# Patient Record
Sex: FEMALE | Race: WHITE | NOT HISPANIC OR LATINO | Employment: FULL TIME | URBAN - METROPOLITAN AREA
[De-identification: names, ages, dates, MRNs, and addresses within clinical notes are randomized per-mention and may not be internally consistent; named-entity substitution may affect disease eponyms.]

---

## 2023-09-17 ENCOUNTER — HOSPITAL ENCOUNTER (OUTPATIENT)
Dept: DATA CONVERSION | Facility: HOSPITAL | Age: 23
Discharge: HOME | End: 2023-09-17

## 2023-09-17 DIAGNOSIS — R42 DIZZINESS AND GIDDINESS: ICD-10-CM

## 2023-09-17 DIAGNOSIS — R00.2 PALPITATIONS: ICD-10-CM

## 2023-09-17 DIAGNOSIS — R07.89 OTHER CHEST PAIN: ICD-10-CM

## 2023-09-17 DIAGNOSIS — R00.0 TACHYCARDIA, UNSPECIFIED: ICD-10-CM

## 2023-09-17 LAB
ALBUMIN SERPL-MCNC: 5 GM/DL (ref 3.5–5)
ALBUMIN/GLOB SERPL: 1.5 RATIO (ref 1.5–3)
ALP BLD-CCNC: 59 U/L (ref 35–125)
ALT SERPL-CCNC: 12 U/L (ref 5–40)
AMPHETAMINES UR QL SCN>1000 NG/ML: NEGATIVE
ANION GAP SERPL CALCULATED.3IONS-SCNC: 12 MMOL/L (ref 0–19)
AST SERPL-CCNC: 21 U/L (ref 5–40)
BACTERIA UR QL AUTO: POSITIVE
BARBITURATES UR QL SCN>300 NG/ML: NEGATIVE
BASOPHILS # BLD AUTO: 0.03 K/UL (ref 0–0.22)
BASOPHILS NFR BLD AUTO: 0.6 % (ref 0–1)
BENZODIAZ UR QL SCN>300 NG/ML: NEGATIVE
BILIRUB SERPL-MCNC: 0.5 MG/DL (ref 0.1–1.2)
BILIRUB UR QL STRIP.AUTO: NEGATIVE
BUN SERPL-MCNC: 12 MG/DL (ref 8–25)
BUN/CREAT SERPL: 17.1 RATIO (ref 8–21)
BZE UR QL SCN>300 NG/ML: NEGATIVE
CALCIUM SERPL-MCNC: 10 MG/DL (ref 8.5–10.4)
CANNABINOIDS UR QL SCN>50 NG/ML: NEGATIVE
CHLORIDE SERPL-SCNC: 104 MMOL/L (ref 97–107)
CLARITY UR: CLEAR
CO2 SERPL-SCNC: 23 MMOL/L (ref 24–31)
COLOR UR: COLORLESS
CREAT SERPL-MCNC: 0.7 MG/DL (ref 0.4–1.6)
D DIMER PPP FEU-MCNC: 0.27 MG/L FEU (ref 0.19–0.5)
DEPRECATED RDW RBC AUTO: 41 FL (ref 37–54)
DIFFERENTIAL METHOD BLD: NORMAL
DRUG SCREEN COMMENT UR-IMP: NORMAL
EOSINOPHIL # BLD AUTO: 0.07 K/UL (ref 0–0.45)
EOSINOPHIL NFR BLD: 1.4 % (ref 0–3)
ERYTHROCYTE [DISTWIDTH] IN BLOOD BY AUTOMATED COUNT: 13 % (ref 11.7–15)
ETHANOL SERPL-MCNC: <0.01 GM/DL (ref 0–0.01)
FENTANYL+NORFENTANYL UR QL SCN: NEGATIVE
GFR SERPL CREATININE-BSD FRML MDRD: 125 ML/MIN/1.73 M2
GLOBULIN SER-MCNC: 3.3 G/DL (ref 1.9–3.7)
GLUCOSE SERPL-MCNC: 108 MG/DL (ref 65–99)
GLUCOSE UR STRIP.AUTO-MCNC: NEGATIVE MG/DL
HCG UR QL: NEGATIVE
HCT VFR BLD AUTO: 40.1 % (ref 36–44)
HGB BLD-MCNC: 13.4 GM/DL (ref 12–15)
HGB UR QL STRIP.AUTO: 2 /HPF (ref 0–3)
HGB UR QL: NEGATIVE
HS TROPONIN T DELTA: 0 (ref 0–4)
HS TROPONIN T DELTA: NORMAL (ref 0–4)
HYALINE CASTS UR QL AUTO: NORMAL /LPF
IMM GRANULOCYTES # BLD AUTO: 0.01 K/UL (ref 0–0.1)
KETONES UR QL STRIP.AUTO: NEGATIVE
LACTATE BLDV-SCNC: 1 MMOL/L (ref 0.4–2)
LACTATE BLDV-SCNC: 2.5 MMOL/L (ref 0.4–2)
LEUKOCYTE ESTERASE UR QL STRIP.AUTO: NEGATIVE
LYMPHOCYTES # BLD AUTO: 1.68 K/UL (ref 1.2–3.2)
LYMPHOCYTES NFR BLD MANUAL: 33.3 % (ref 20–40)
MAGNESIUM SERPL-MCNC: 2.1 MG/DL (ref 1.6–3.1)
MCH RBC QN AUTO: 28.9 PG (ref 26–34)
MCHC RBC AUTO-ENTMCNC: 33.4 % (ref 31–37)
MCV RBC AUTO: 86.4 FL (ref 80–100)
METHADONE UR QL SCN>300 NG/ML: NEGATIVE
MICROSCOPIC (UA): NORMAL
MONOCYTES # BLD AUTO: 0.28 K/UL (ref 0–0.8)
MONOCYTES NFR BLD MANUAL: 5.5 % (ref 0–8)
NEUTROPHILS # BLD AUTO: 2.98 K/UL
NEUTROPHILS # BLD AUTO: 2.98 K/UL (ref 1.8–7.7)
NEUTROPHILS.IMMATURE NFR BLD: 0.2 % (ref 0–1)
NEUTS SEG NFR BLD: 59 % (ref 50–70)
NITRITE UR QL STRIP.AUTO: NEGATIVE
NRBC BLD-RTO: 0 /100 WBC
OPIATES UR QL SCN>300 NG/ML: NEGATIVE
OXYCODONE UR QL: NEGATIVE
PCP UR QL SCN>25 NG/ML: NEGATIVE
PH UR STRIP.AUTO: 8 [PH] (ref 4.6–8)
PLATELET # BLD AUTO: 222 K/UL (ref 150–450)
PMV BLD AUTO: 10.5 CU (ref 7–12.6)
POTASSIUM SERPL-SCNC: 3.8 MMOL/L (ref 3.4–5.1)
PROT SERPL-MCNC: 8.3 G/DL (ref 5.9–7.9)
PROT UR STRIP.AUTO-MCNC: NEGATIVE MG/DL
RBC # BLD AUTO: 4.64 M/UL (ref 4–4.9)
SODIUM SERPL-SCNC: 139 MMOL/L (ref 133–145)
SP GR UR STRIP.AUTO: 1.01 (ref 1–1.03)
SQUAMOUS UR QL AUTO: NORMAL /HPF
TROPONIN T SERPL-MCNC: 6 NG/L
TROPONIN T SERPL-MCNC: 6 NG/L
TSH SERPL DL<=0.05 MIU/L-ACNC: 1.37 MIU/L (ref 0.27–4.2)
URINE CULTURE: NORMAL
UROBILINOGEN UR QL STRIP.AUTO: NORMAL MG/DL (ref 0–1)
WBC # BLD AUTO: 5.1 K/UL (ref 4.5–11)
WBC #/AREA URNS AUTO: 1 /HPF (ref 0–3)

## 2023-11-17 ENCOUNTER — TELEPHONE (OUTPATIENT)
Dept: CARDIOLOGY | Facility: HOSPITAL | Age: 23
End: 2023-11-17

## 2023-11-20 ENCOUNTER — APPOINTMENT (OUTPATIENT)
Dept: CARDIOLOGY | Facility: HOSPITAL | Age: 23
End: 2023-11-20
Payer: COMMERCIAL

## 2024-01-17 ENCOUNTER — APPOINTMENT (OUTPATIENT)
Dept: CARDIOLOGY | Facility: HOSPITAL | Age: 24
End: 2024-01-17
Payer: COMMERCIAL

## 2024-01-31 ENCOUNTER — HOSPITAL ENCOUNTER (OUTPATIENT)
Dept: CARDIOLOGY | Facility: HOSPITAL | Age: 24
Discharge: HOME | End: 2024-01-31
Payer: COMMERCIAL

## 2024-01-31 VITALS
SYSTOLIC BLOOD PRESSURE: 106 MMHG | RESPIRATION RATE: 18 BRPM | DIASTOLIC BLOOD PRESSURE: 67 MMHG | OXYGEN SATURATION: 98 % | HEART RATE: 63 BPM

## 2024-01-31 DIAGNOSIS — R42 DIZZINESS AND GIDDINESS: ICD-10-CM

## 2024-01-31 DIAGNOSIS — R42 DIZZINESS: ICD-10-CM

## 2024-01-31 DIAGNOSIS — G90.A POTS (POSTURAL ORTHOSTATIC TACHYCARDIA SYNDROME): ICD-10-CM

## 2024-01-31 DIAGNOSIS — R00.2 PALPITATIONS: Primary | ICD-10-CM

## 2024-01-31 DIAGNOSIS — R00.0 TACHYCARDIA: ICD-10-CM

## 2024-01-31 PROBLEM — R07.9 CHEST PAIN: Status: ACTIVE | Noted: 2024-01-31

## 2024-01-31 PROCEDURE — 93660 TILT TABLE EVALUATION: CPT

## 2024-01-31 PROCEDURE — 93660 TILT TABLE EVALUATION: CPT | Performed by: INTERNAL MEDICINE

## 2024-01-31 RX ORDER — METOPROLOL SUCCINATE 25 MG/1
25 TABLET, EXTENDED RELEASE ORAL DAILY
Qty: 30 TABLET | Refills: 0 | Status: SHIPPED | OUTPATIENT
Start: 2024-01-31 | End: 2024-03-01

## 2024-01-31 ASSESSMENT — COLUMBIA-SUICIDE SEVERITY RATING SCALE - C-SSRS
2. HAVE YOU ACTUALLY HAD ANY THOUGHTS OF KILLING YOURSELF?: NO
1. IN THE PAST MONTH, HAVE YOU WISHED YOU WERE DEAD OR WISHED YOU COULD GO TO SLEEP AND NOT WAKE UP?: NO
6. HAVE YOU EVER DONE ANYTHING, STARTED TO DO ANYTHING, OR PREPARED TO DO ANYTHING TO END YOUR LIFE?: NO

## 2024-01-31 ASSESSMENT — PAIN SCALES - GENERAL
PAINLEVEL_OUTOF10: 0 - NO PAIN
PAINLEVEL_OUTOF10: 0 - NO PAIN

## 2024-01-31 ASSESSMENT — PAIN - FUNCTIONAL ASSESSMENT
PAIN_FUNCTIONAL_ASSESSMENT: 0-10
PAIN_FUNCTIONAL_ASSESSMENT: 0-10

## 2024-01-31 NOTE — POST-PROCEDURE NOTE
Physician Transition of Care Summary  Invasive Cardiovascular Lab    Procedure Date: 1/31/2024  Attending: Dr. Ayde Arriaza  Resident/Fellow/Other Assistant:     Indications:   Palpitations, dizziness, orthostatic hypotension    Post-procedure diagnosis:   Borderline for POTS.  There was significant change in heart rate and symptoms of palpitations and dizziness without significant drop in blood pressure.    Procedure(s):   Will table test      Procedure Findings:   Patient heart rate increased up to 140 bpm few minutes after she was 80 degrees.  Her blood pressure remained stable.  She did have symptoms of palpitations and dizziness.  This is suggestive of possible POTS although patient never dropped her blood pressure.    Description of the Procedure:   Patient was brought to the Cath Lab and tilt table test was performed per protocol.  Initially 0 degrees followed by 30 degrees followed by 60 degrees and then 80 degrees.  Patient felt dizzy lightheaded when she was 80 degrees after few minutes of changing position and she was having palpitations and not feeling good.  Her initial blood pressure was 107/67 heart rate in the 80s.  Her blood pressure remains around that range when she went up to 80 degrees but heart rate increased up to 140 bpm.  Patient was symptomatic.    Complications:   None    Stents/Implants:   None    Anticoagulation/Antiplatelet Plan:   None    Estimated Blood Loss:   * No values recorded between 1/31/2024  9:20 AM and 1/31/2024 10:16 AM *    Anesthesia: * No anesthesia type entered * Anesthesia Staff: No anesthesia staff entered.    Any Specimen(s) Removed:   No specimens collected during this procedure.    Disposition:   Patient will be started on small dose of metoprolol succinate 25 mg oral daily.  Follow-up with Dr. Dumont to discuss the result of her tilt table test as well as the monitor that she had      Electronically signed by: Ayde Arriaza MD, 1/31/2024 10:57 AM

## 2024-01-31 NOTE — DISCHARGE SUMMARY
Discharge Diagnosis  POTS    Issues Requiring Follow-Up  Follow-up with Dr. Dumont    Test Results Pending At Discharge  Pending Labs       No current pending labs.            Hospital Course   Uncomplicated    Pertinent Physical Exam At Time of Discharge  Physical Exam    Home Medications     Medication List      START taking these medications     metoprolol succinate XL 25 mg 24 hr tablet; Commonly known as:   Toprol-XL; Take 1 tablet (25 mg) by mouth once daily. Do not crush or   chew.       Outpatient Follow-Up  No future appointments.    Ayde Arriaza MD